# Patient Record
Sex: MALE | Race: BLACK OR AFRICAN AMERICAN
[De-identification: names, ages, dates, MRNs, and addresses within clinical notes are randomized per-mention and may not be internally consistent; named-entity substitution may affect disease eponyms.]

---

## 2017-07-06 ENCOUNTER — HOSPITAL ENCOUNTER (EMERGENCY)
Dept: HOSPITAL 12 - ER | Age: 37
Discharge: HOME | End: 2017-07-06
Payer: COMMERCIAL

## 2017-07-06 VITALS — HEIGHT: 71 IN | WEIGHT: 210 LBS | BODY MASS INDEX: 29.4 KG/M2

## 2017-07-06 VITALS — DIASTOLIC BLOOD PRESSURE: 66 MMHG | SYSTOLIC BLOOD PRESSURE: 122 MMHG
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
## 2017-07-06 DIAGNOSIS — F17.200: ICD-10-CM

## 2017-07-06 DIAGNOSIS — S53.402A: Primary | ICD-10-CM
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
## 2017-07-06 DIAGNOSIS — F10.20: ICD-10-CM

## 2017-07-06 DIAGNOSIS — Z88.6: ICD-10-CM

## 2017-07-06 PROCEDURE — A4663 DIALYSIS BLOOD PRESSURE CUFF: HCPCS

## 2017-07-06 RX ADMIN — HYDROCODONE BITARTRATE AND ACETAMINOPHEN ONE TAB: 5; 325 TABLET ORAL at 17:26

## 2017-07-06 RX ADMIN — IBUPROFEN ONE MG: 600 TABLET, FILM COATED ORAL at 17:05

## 2017-07-06 NOTE — NUR
MSE COMPLETED, PT D/C'D HOMR, ACI/RX X2/WORK NOTE GIVEN. PT AMBULATED W/O 
DIFF/TOOK AL BELONGINGS. PT STATED DECREASED PAIN.

## 2017-07-12 ENCOUNTER — HOSPITAL ENCOUNTER (EMERGENCY)
Dept: HOSPITAL 12 - ER | Age: 37
Discharge: HOME | End: 2017-07-12
Payer: COMMERCIAL

## 2017-07-12 VITALS — SYSTOLIC BLOOD PRESSURE: 131 MMHG | DIASTOLIC BLOOD PRESSURE: 72 MMHG

## 2017-07-12 VITALS — WEIGHT: 210 LBS | HEIGHT: 71 IN | BODY MASS INDEX: 29.4 KG/M2

## 2017-07-12 DIAGNOSIS — M25.522: Primary | ICD-10-CM

## 2017-07-12 DIAGNOSIS — Z88.6: ICD-10-CM

## 2017-07-12 DIAGNOSIS — F10.20: ICD-10-CM

## 2017-07-12 DIAGNOSIS — F17.200: ICD-10-CM

## 2017-07-12 PROCEDURE — 99283 EMERGENCY DEPT VISIT LOW MDM: CPT

## 2017-07-12 PROCEDURE — A4663 DIALYSIS BLOOD PRESSURE CUFF: HCPCS

## 2019-08-31 ENCOUNTER — HOSPITAL ENCOUNTER (EMERGENCY)
Dept: HOSPITAL 12 - ER | Age: 39
Discharge: HOME | End: 2019-08-31
Payer: COMMERCIAL

## 2019-08-31 VITALS — HEIGHT: 71 IN | WEIGHT: 180 LBS | BODY MASS INDEX: 25.2 KG/M2

## 2019-08-31 DIAGNOSIS — B00.9: Primary | ICD-10-CM

## 2019-08-31 DIAGNOSIS — F17.200: ICD-10-CM

## 2019-08-31 DIAGNOSIS — Z88.8: ICD-10-CM

## 2019-08-31 PROCEDURE — A4663 DIALYSIS BLOOD PRESSURE CUFF: HCPCS

## 2019-08-31 NOTE — NUR
Patient discharged to home in stable conditon with brisk steady gait.  Written 
and verbal after care instructions given to patient. Patient verbalizes 
understanding of instructions.